# Patient Record
Sex: FEMALE | Race: OTHER | HISPANIC OR LATINO
[De-identification: names, ages, dates, MRNs, and addresses within clinical notes are randomized per-mention and may not be internally consistent; named-entity substitution may affect disease eponyms.]

---

## 2024-02-01 ENCOUNTER — APPOINTMENT (OUTPATIENT)
Dept: ANTEPARTUM | Facility: CLINIC | Age: 39
End: 2024-02-01
Payer: MEDICAID

## 2024-02-01 ENCOUNTER — ASOB RESULT (OUTPATIENT)
Age: 39
End: 2024-02-01

## 2024-02-01 PROCEDURE — 76819 FETAL BIOPHYS PROFIL W/O NST: CPT

## 2024-02-01 PROCEDURE — 76816 OB US FOLLOW-UP PER FETUS: CPT

## 2024-02-29 ENCOUNTER — APPOINTMENT (OUTPATIENT)
Dept: ANTEPARTUM | Facility: CLINIC | Age: 39
End: 2024-02-29
Payer: MEDICAID

## 2024-02-29 ENCOUNTER — ASOB RESULT (OUTPATIENT)
Age: 39
End: 2024-02-29

## 2024-02-29 PROBLEM — Z00.00 ENCOUNTER FOR PREVENTIVE HEALTH EXAMINATION: Status: ACTIVE | Noted: 2024-02-29

## 2024-02-29 PROCEDURE — 76816 OB US FOLLOW-UP PER FETUS: CPT

## 2024-02-29 PROCEDURE — 76819 FETAL BIOPHYS PROFIL W/O NST: CPT

## 2024-03-06 ENCOUNTER — ASOB RESULT (OUTPATIENT)
Age: 39
End: 2024-03-06

## 2024-03-06 ENCOUNTER — APPOINTMENT (OUTPATIENT)
Dept: MATERNAL FETAL MEDICINE | Facility: CLINIC | Age: 39
End: 2024-03-06
Payer: MEDICAID

## 2024-03-06 PROCEDURE — 99202 OFFICE O/P NEW SF 15 MIN: CPT | Mod: TH,95

## 2024-03-28 ENCOUNTER — ASOB RESULT (OUTPATIENT)
Age: 39
End: 2024-03-28

## 2024-03-28 ENCOUNTER — APPOINTMENT (OUTPATIENT)
Dept: ANTEPARTUM | Facility: CLINIC | Age: 39
End: 2024-03-28
Payer: MEDICAID

## 2024-03-28 PROCEDURE — 76819 FETAL BIOPHYS PROFIL W/O NST: CPT

## 2024-03-28 PROCEDURE — 76816 OB US FOLLOW-UP PER FETUS: CPT

## 2024-04-05 ENCOUNTER — OUTPATIENT (OUTPATIENT)
Dept: OUTPATIENT SERVICES | Facility: HOSPITAL | Age: 39
LOS: 1 days | End: 2024-04-05
Payer: COMMERCIAL

## 2024-04-05 DIAGNOSIS — Z01.812 ENCOUNTER FOR PREPROCEDURAL LABORATORY EXAMINATION: ICD-10-CM

## 2024-04-05 LAB
ABO RH CONFIRMATION: SIGNIFICANT CHANGE UP
BASOPHILS # BLD AUTO: 0.03 K/UL — SIGNIFICANT CHANGE UP (ref 0–0.2)
BASOPHILS NFR BLD AUTO: 0.4 % — SIGNIFICANT CHANGE UP (ref 0–2)
BLD GP AB SCN SERPL QL: SIGNIFICANT CHANGE UP
COMMENT - BLOOD BANK: SIGNIFICANT CHANGE UP
EOSINOPHIL # BLD AUTO: 0.08 K/UL — SIGNIFICANT CHANGE UP (ref 0–0.5)
EOSINOPHIL NFR BLD AUTO: 1 % — SIGNIFICANT CHANGE UP (ref 0–6)
HCT VFR BLD CALC: 35.6 % — SIGNIFICANT CHANGE UP (ref 34.5–45)
HGB BLD-MCNC: 12.1 G/DL — SIGNIFICANT CHANGE UP (ref 11.5–15.5)
IMM GRANULOCYTES NFR BLD AUTO: 0.9 % — SIGNIFICANT CHANGE UP (ref 0–0.9)
LYMPHOCYTES # BLD AUTO: 1.21 K/UL — SIGNIFICANT CHANGE UP (ref 1–3.3)
LYMPHOCYTES # BLD AUTO: 15.6 % — SIGNIFICANT CHANGE UP (ref 13–44)
MCHC RBC-ENTMCNC: 30.8 PG — SIGNIFICANT CHANGE UP (ref 27–34)
MCHC RBC-ENTMCNC: 34 GM/DL — SIGNIFICANT CHANGE UP (ref 32–36)
MCV RBC AUTO: 90.6 FL — SIGNIFICANT CHANGE UP (ref 80–100)
MONOCYTES # BLD AUTO: 0.38 K/UL — SIGNIFICANT CHANGE UP (ref 0–0.9)
MONOCYTES NFR BLD AUTO: 4.9 % — SIGNIFICANT CHANGE UP (ref 2–14)
NEUTROPHILS # BLD AUTO: 5.97 K/UL — SIGNIFICANT CHANGE UP (ref 1.8–7.4)
NEUTROPHILS NFR BLD AUTO: 77.2 % — HIGH (ref 43–77)
PLATELET # BLD AUTO: 252 K/UL — SIGNIFICANT CHANGE UP (ref 150–400)
RBC # BLD: 3.93 M/UL — SIGNIFICANT CHANGE UP (ref 3.8–5.2)
RBC # FLD: 14 % — SIGNIFICANT CHANGE UP (ref 10.3–14.5)
WBC # BLD: 7.74 K/UL — SIGNIFICANT CHANGE UP (ref 3.8–10.5)
WBC # FLD AUTO: 7.74 K/UL — SIGNIFICANT CHANGE UP (ref 3.8–10.5)

## 2024-04-05 PROCEDURE — 85025 COMPLETE CBC W/AUTO DIFF WBC: CPT

## 2024-04-05 PROCEDURE — 36415 COLL VENOUS BLD VENIPUNCTURE: CPT

## 2024-04-05 PROCEDURE — 86901 BLOOD TYPING SEROLOGIC RH(D): CPT

## 2024-04-05 PROCEDURE — 86900 BLOOD TYPING SEROLOGIC ABO: CPT

## 2024-04-05 PROCEDURE — 86850 RBC ANTIBODY SCREEN: CPT

## 2024-04-07 ENCOUNTER — TRANSCRIPTION ENCOUNTER (OUTPATIENT)
Age: 39
End: 2024-04-07

## 2024-04-07 RX ORDER — ACETAMINOPHEN 500 MG
975 TABLET ORAL ONCE
Refills: 0 | Status: COMPLETED | OUTPATIENT
Start: 2024-04-08 | End: 2024-04-08

## 2024-04-07 RX ORDER — CEFAZOLIN SODIUM 1 G
2000 VIAL (EA) INJECTION ONCE
Refills: 0 | Status: COMPLETED | OUTPATIENT
Start: 2024-04-08 | End: 2024-04-08

## 2024-04-07 RX ORDER — SODIUM CHLORIDE 9 MG/ML
1000 INJECTION, SOLUTION INTRAVENOUS
Refills: 0 | Status: DISCONTINUED | OUTPATIENT
Start: 2024-04-08 | End: 2024-04-08

## 2024-04-07 RX ORDER — OXYTOCIN 10 UNIT/ML
333.33 VIAL (ML) INJECTION
Qty: 20 | Refills: 0 | Status: DISCONTINUED | OUTPATIENT
Start: 2024-04-08 | End: 2024-04-10

## 2024-04-07 RX ORDER — SCOPALAMINE 1 MG/3D
1 PATCH, EXTENDED RELEASE TRANSDERMAL ONCE
Refills: 0 | Status: COMPLETED | OUTPATIENT
Start: 2024-04-08 | End: 2024-04-08

## 2024-04-07 RX ORDER — CHLORHEXIDINE GLUCONATE 213 G/1000ML
1 SOLUTION TOPICAL DAILY
Refills: 0 | Status: DISCONTINUED | OUTPATIENT
Start: 2024-04-08 | End: 2024-04-08

## 2024-04-07 RX ORDER — FAMOTIDINE 10 MG/ML
20 INJECTION INTRAVENOUS ONCE
Refills: 0 | Status: COMPLETED | OUTPATIENT
Start: 2024-04-08 | End: 2024-04-08

## 2024-04-07 RX ORDER — SODIUM CHLORIDE 9 MG/ML
1000 INJECTION, SOLUTION INTRAVENOUS ONCE
Refills: 0 | Status: COMPLETED | OUTPATIENT
Start: 2024-04-08 | End: 2024-04-08

## 2024-04-07 RX ORDER — CITRIC ACID/SODIUM CITRATE 300-500 MG
30 SOLUTION, ORAL ORAL ONCE
Refills: 0 | Status: COMPLETED | OUTPATIENT
Start: 2024-04-08 | End: 2024-04-08

## 2024-04-08 ENCOUNTER — INPATIENT (INPATIENT)
Facility: HOSPITAL | Age: 39
LOS: 1 days | Discharge: HOME | End: 2024-04-10
Attending: OBSTETRICS & GYNECOLOGY | Admitting: OBSTETRICS & GYNECOLOGY
Payer: COMMERCIAL

## 2024-04-08 ENCOUNTER — TRANSCRIPTION ENCOUNTER (OUTPATIENT)
Age: 39
End: 2024-04-08

## 2024-04-08 VITALS — DIASTOLIC BLOOD PRESSURE: 54 MMHG | HEART RATE: 82 BPM | SYSTOLIC BLOOD PRESSURE: 97 MMHG

## 2024-04-08 LAB
BASOPHILS # BLD AUTO: 0.04 K/UL — SIGNIFICANT CHANGE UP (ref 0–0.2)
BASOPHILS NFR BLD AUTO: 0.5 % — SIGNIFICANT CHANGE UP (ref 0–2)
BLD GP AB SCN SERPL QL: SIGNIFICANT CHANGE UP
EOSINOPHIL # BLD AUTO: 0.03 K/UL — SIGNIFICANT CHANGE UP (ref 0–0.5)
EOSINOPHIL NFR BLD AUTO: 0.3 % — SIGNIFICANT CHANGE UP (ref 0–6)
HCT VFR BLD CALC: 38.6 % — SIGNIFICANT CHANGE UP (ref 34.5–45)
HGB BLD-MCNC: 13 G/DL — SIGNIFICANT CHANGE UP (ref 11.5–15.5)
IMM GRANULOCYTES NFR BLD AUTO: 0.9 % — SIGNIFICANT CHANGE UP (ref 0–0.9)
LYMPHOCYTES # BLD AUTO: 1.48 K/UL — SIGNIFICANT CHANGE UP (ref 1–3.3)
LYMPHOCYTES # BLD AUTO: 16.9 % — SIGNIFICANT CHANGE UP (ref 13–44)
MCHC RBC-ENTMCNC: 30.5 PG — SIGNIFICANT CHANGE UP (ref 27–34)
MCHC RBC-ENTMCNC: 33.7 GM/DL — SIGNIFICANT CHANGE UP (ref 32–36)
MCV RBC AUTO: 90.6 FL — SIGNIFICANT CHANGE UP (ref 80–100)
MONOCYTES # BLD AUTO: 0.45 K/UL — SIGNIFICANT CHANGE UP (ref 0–0.9)
MONOCYTES NFR BLD AUTO: 5.1 % — SIGNIFICANT CHANGE UP (ref 2–14)
NEUTROPHILS # BLD AUTO: 6.68 K/UL — SIGNIFICANT CHANGE UP (ref 1.8–7.4)
NEUTROPHILS NFR BLD AUTO: 76.3 % — SIGNIFICANT CHANGE UP (ref 43–77)
PLATELET # BLD AUTO: 254 K/UL — SIGNIFICANT CHANGE UP (ref 150–400)
RBC # BLD: 4.26 M/UL — SIGNIFICANT CHANGE UP (ref 3.8–5.2)
RBC # FLD: 14.1 % — SIGNIFICANT CHANGE UP (ref 10.3–14.5)
WBC # BLD: 8.76 K/UL — SIGNIFICANT CHANGE UP (ref 3.8–10.5)
WBC # FLD AUTO: 8.76 K/UL — SIGNIFICANT CHANGE UP (ref 3.8–10.5)

## 2024-04-08 DEVICE — SURGICEL SNOW 4 X 4": Type: IMPLANTABLE DEVICE | Status: FUNCTIONAL

## 2024-04-08 RX ORDER — KETOROLAC TROMETHAMINE 30 MG/ML
30 SYRINGE (ML) INJECTION EVERY 6 HOURS
Refills: 0 | Status: DISCONTINUED | OUTPATIENT
Start: 2024-04-08 | End: 2024-04-09

## 2024-04-08 RX ORDER — ENOXAPARIN SODIUM 100 MG/ML
40 INJECTION SUBCUTANEOUS EVERY 24 HOURS
Refills: 0 | Status: DISCONTINUED | OUTPATIENT
Start: 2024-04-08 | End: 2024-04-10

## 2024-04-08 RX ORDER — ACETAMINOPHEN 500 MG
975 TABLET ORAL
Refills: 0 | Status: DISCONTINUED | OUTPATIENT
Start: 2024-04-08 | End: 2024-04-10

## 2024-04-08 RX ORDER — OXYTOCIN 10 UNIT/ML
333.33 VIAL (ML) INJECTION
Qty: 20 | Refills: 0 | Status: DISCONTINUED | OUTPATIENT
Start: 2024-04-08 | End: 2024-04-10

## 2024-04-08 RX ORDER — SIMETHICONE 80 MG/1
80 TABLET, CHEWABLE ORAL EVERY 4 HOURS
Refills: 0 | Status: DISCONTINUED | OUTPATIENT
Start: 2024-04-08 | End: 2024-04-10

## 2024-04-08 RX ORDER — OXYCODONE HYDROCHLORIDE 5 MG/1
5 TABLET ORAL ONCE
Refills: 0 | Status: DISCONTINUED | OUTPATIENT
Start: 2024-04-08 | End: 2024-04-10

## 2024-04-08 RX ORDER — MAGNESIUM HYDROXIDE 400 MG/1
30 TABLET, CHEWABLE ORAL
Refills: 0 | Status: DISCONTINUED | OUTPATIENT
Start: 2024-04-08 | End: 2024-04-10

## 2024-04-08 RX ORDER — IBUPROFEN 200 MG
600 TABLET ORAL EVERY 6 HOURS
Refills: 0 | Status: COMPLETED | OUTPATIENT
Start: 2024-04-08 | End: 2025-03-07

## 2024-04-08 RX ORDER — SODIUM CHLORIDE 9 MG/ML
1000 INJECTION, SOLUTION INTRAVENOUS
Refills: 0 | Status: DISCONTINUED | OUTPATIENT
Start: 2024-04-08 | End: 2024-04-10

## 2024-04-08 RX ORDER — DIPHENHYDRAMINE HCL 50 MG
25 CAPSULE ORAL EVERY 6 HOURS
Refills: 0 | Status: DISCONTINUED | OUTPATIENT
Start: 2024-04-08 | End: 2024-04-10

## 2024-04-08 RX ORDER — TETANUS TOXOID, REDUCED DIPHTHERIA TOXOID AND ACELLULAR PERTUSSIS VACCINE, ADSORBED 5; 2.5; 8; 8; 2.5 [IU]/.5ML; [IU]/.5ML; UG/.5ML; UG/.5ML; UG/.5ML
0.5 SUSPENSION INTRAMUSCULAR ONCE
Refills: 0 | Status: DISCONTINUED | OUTPATIENT
Start: 2024-04-08 | End: 2024-04-10

## 2024-04-08 RX ORDER — OXYCODONE HYDROCHLORIDE 5 MG/1
5 TABLET ORAL
Refills: 0 | Status: DISCONTINUED | OUTPATIENT
Start: 2024-04-08 | End: 2024-04-10

## 2024-04-08 RX ORDER — LANOLIN
1 OINTMENT (GRAM) TOPICAL EVERY 6 HOURS
Refills: 0 | Status: DISCONTINUED | OUTPATIENT
Start: 2024-04-08 | End: 2024-04-10

## 2024-04-08 RX ADMIN — SCOPALAMINE 1 PATCH: 1 PATCH, EXTENDED RELEASE TRANSDERMAL at 15:34

## 2024-04-08 RX ADMIN — FAMOTIDINE 20 MILLIGRAM(S): 10 INJECTION INTRAVENOUS at 15:34

## 2024-04-08 RX ADMIN — SODIUM CHLORIDE 2000 MILLILITER(S): 9 INJECTION, SOLUTION INTRAVENOUS at 14:44

## 2024-04-08 RX ADMIN — Medication 30 MILLIGRAM(S): at 17:00

## 2024-04-08 RX ADMIN — SODIUM CHLORIDE 125 MILLILITER(S): 9 INJECTION, SOLUTION INTRAVENOUS at 15:40

## 2024-04-08 RX ADMIN — Medication 1000 MILLIUNIT(S)/MIN: at 20:27

## 2024-04-08 RX ADMIN — Medication 30 MILLILITER(S): at 15:34

## 2024-04-08 RX ADMIN — Medication 1000 MILLIUNIT(S)/MIN: at 17:34

## 2024-04-08 RX ADMIN — Medication 0.2 MILLIGRAM(S): at 17:44

## 2024-04-08 RX ADMIN — Medication 2000 MILLIGRAM(S): at 16:54

## 2024-04-08 RX ADMIN — Medication 975 MILLIGRAM(S): at 15:34

## 2024-04-08 RX ADMIN — Medication 975 MILLIGRAM(S): at 19:15

## 2024-04-08 NOTE — OB RN DELIVERY SUMMARY - NSSELHIDDEN_OBGYN_ALL_OB_FT
[NS_DeliveryAttending1_OBGYN_ALL_OB_FT:[id number]] [NS_DeliveryAttending1_OBGYN_ALL_OB_FT:[id number]=],[NS_DeliveryAssist1_OBGYN_ALL_OB_FT:[id number]=],[NS_DeliveryRN_OBGYN_ALL_OB_FT:[id number]=]

## 2024-04-08 NOTE — OB PROVIDER DELIVERY SUMMARY - NSPROVIDERDELIVERYNOTE_OBGYN_ALL_OB_FT
Brief  Delivery Summary    Procedure: rLTCS for IUP @ 39wks GA, prior CSx1  Findings: Viable male infant, apgars 9/9, weight 3770g,  cephalic presentation. Grossly normal uterus, fallopian tubes and ovaries.   Single layer uterine closure  SubQ skin closure  EBL: 1303cc  UOP: 200cc  Fluids in OR: 1500cc  Complications: stable PPH 2/2 uterine atony received IM methergine x1 with good response in tone Brief  Delivery Summary    Procedure: rLTCS for IUP @ 39wks GA, prior CSx1  Findings: Viable male infant, apgars 9/9, weight 3770g,  cephalic presentation. Grossly normal uterus, fallopian tubes and ovaries.   Single layer uterine closure with multiples figure of eight. SNOW placed over closed hysterotomy  SubQ skin closure  EBL: 1303cc  UOP: 200cc  Fluids in OR: 1500cc  Complications: stable PPH 2/2 uterine atony received IM methergine x1 with good response in tone    agree with above  ppalos Brief  Delivery Summary    Procedure: rLTCS for IUP @ 39wks GA, prior CSx1  Findings: Viable male infant, apgars 9/9, weight 3770g,  cephalic presentation. Grossly normal uterus, fallopian tubes and ovaries.   Single layer uterine closure with multiples figure of eight. SNOW placed over closed hysterotomy  SubQ skin closure  EBL: 1303cc  UOP: 200cc  Fluids in OR: 1500cc  Complications: surgical sno placed over hysterotomy site. stable PPH 2/2 uterine atony received IM methergine x1 with good response in tone.     Dictation # [id number]    agree with above  ppalos

## 2024-04-08 NOTE — OB RN INTRAOPERATIVE NOTE - NSSELHIDDEN_OBGYN_ALL_OB_FT
[NS_DeliveryAttending1_OBGYN_ALL_OB_FT:[id number]=],[NS_DeliveryAssist1_OBGYN_ALL_OB_FT:[id number]=],[NS_DeliveryRN_OBGYN_ALL_OB_FT:[id number]=]

## 2024-04-08 NOTE — OB PROVIDER H&P - ATTENDING COMMENTS
@ 39wks here for scheduled repeat  section. GBSneg. Maternal/fetal status reassuring    -admit  -routine  section orders  -NPO  -anesthesia consult  -proceed with  section  -risks and benefits discussed. Consents explained and signed. ALl questions and concerns answered.  ppalos

## 2024-04-08 NOTE — DISCHARGE NOTE OB - CARE PROVIDER_API CALL
Lea Batista  Obstetrics and Gynecology  [facility] [place] [address]  Phone: [phone]  Fax: [phone]  Established Patient  Follow Up Time: 2 weeks

## 2024-04-08 NOTE — OB PROVIDER H&P - NSCORESITESY/N_GEN_A_CORE_RD
Denies known Latex allergy or symptoms of Latex sensitivity.    Evelia Alexander is a 54 year old female presenting with chest pain since last night with mid sternum without cough, some relief when pushing on mid sternum.    Patient denies fever.  Patient denies any other concerns or symptoms.       No

## 2024-04-08 NOTE — DISCHARGE NOTE OB - NS MD DC FALL RISK RISK
For information on Fall & Injury Prevention, visit: [email or web address] OR  [email or web address] OR  https://www.cdc.gov/steadi/patient.html

## 2024-04-08 NOTE — OB PROVIDER H&P - ASSESSMENT
A/P: 38y  @ 39wks GA admitted to L&D for rCS for prior CSx1  -Admit to L&D  -Consent  -Admission labs  -NPO  -IV fluids  -Fetus: Cat I tracing. Continuous toco and fetal monitoring.   -GBS: Negative, no GBS ppx required   -DVT ppx: Ambulate and SCD's while in bed ]  -Ancef 2g for ppx    Discussed with Dr. Batista

## 2024-04-08 NOTE — OB RN DELIVERY SUMMARY - NS_SEPSISRSKCALC_OBGYN_ALL_OB_FT
No temperature has been documented for this patient in CPN or on the OB Flowsheet. Ensure the highest temperature during labor was documented on the OB Flowsheet.  No gestational age at birth has been documented. Ensure delivery date/time has been entered above.  Rupture of membranes must be entered above.   EOS calculated successfully. EOS Risk Factor: 0.5/1000 live births ([facility] national incidence); GA=39w;Temp=98.6; ROM=0.033; GBS='Negative'; Antibiotics='No antibiotics or any antibiotics < 2 hrs prior to birth'

## 2024-04-08 NOTE — OB PROVIDER DELIVERY SUMMARY - NSSELHIDDEN_OBGYN_ALL_OB_FT
[NS_DeliveryAttending1_OBGYN_ALL_OB_FT:[id number]=],[NS_DeliveryAssist1_OBGYN_ALL_OB_FT:[id number]=],[NS_DeliveryRN_OBGYN_ALL_OB_FT:[id number]=]

## 2024-04-08 NOTE — DISCHARGE NOTE OB - PATIENT PORTAL LINK FT
You can access the FollowMyHealth Patient Portal offered by [facility] by registering at the following website: [email or web address]. By joining [employer]’s FollowMyHealth portal, you will also be able to view your health information using other applications (apps) compatible with our system.

## 2024-04-08 NOTE — DISCHARGE NOTE OB - CARE PLAN
Principal Discharge DX:	 delivery delivered  Assessment and plan of treatment:	Assessment and Plan of Treatment: Please call your provider to schedule postoperative wound check visit in 1-2 weeks. Take medications as directed, regular diet, activity as tolerated. Exclusive breast feeding for the first 6 months is recommended. Nothing per vagina for 6 weeks (incl. sex, douching, etc). If you have additional concerns, please inform your provider.  Secondary Diagnosis:	Anemia due to acute blood loss  Assessment and plan of treatment:	Patient with mild anemia secondary to acute blood loss with delivery, stable. Patient should follow with OB  at regular postpartum check, and to call doctor sooner if develop symptoms of anemia such as shortness of breath, lightheadedness, or fainting. If medication such as ferrous sulfate is prescribed, take as directed.   1

## 2024-04-08 NOTE — OB PROVIDER H&P - NSHOSPITALIZATION_OBGYN_ALL_OB
Patient called to say that she started her new job, so her new Insurance that will start July 1st will now be in-network for Dr. Paniagua's visits.  She is ready to make appt for her return visit in July.  After chart review, advised her I would place orders for 6mwt, Echo, RHC, labs & follow up with Dr. Paniagua.  She should hear from Patricia to schedule.      I asked that as soon as she has her new insurance card, she update her information in our system, as well as send us a picture attached to [employer] so Janey can submit new PA.  Patient verbalized understanding, agreed with plan and denied any further questions. Yoly Villegas RN on 6/21/2023 at 10:55 AM    Placed all orders & sent msg to Patricia. Yoly Villegas RN on 6/21/2023 at 11:03 AM    
No

## 2024-04-08 NOTE — DISCHARGE NOTE OB - PLAN OF CARE
Patient with mild anemia secondary to acute blood loss with delivery, stable. Patient should follow with OB  at regular postpartum check, and to call doctor sooner if develop symptoms of anemia such as shortness of breath, lightheadedness, or fainting. If medication such as ferrous sulfate is prescribed, take as directed. Assessment and Plan of Treatment: Please call your provider to schedule postoperative wound check visit in 1-2 weeks. Take medications as directed, regular diet, activity as tolerated. Exclusive breast feeding for the first 6 months is recommended. Nothing per vagina for 6 weeks (incl. sex, douching, etc). If you have additional concerns, please inform your provider.

## 2024-04-08 NOTE — OB PROVIDER H&P - NSHPPHYSICALEXAM_GEN_ALL_CORE
T(C): --  HR: --  BP: --  RR: --  SpO2: --  Gen: NAD, well-appearing   Abd: Soft, gravid  Ext: non-tender, non-edematous  SVE:    Bedside sono:  FHT:  [place]: Vital Signs Last 24 Hrs  T(C): 36.7 (08 Apr 2024 13:47), Max: 36.7 (08 Apr 2024 13:47)  T(F): 98.1 (08 Apr 2024 13:47), Max: 98.1 (08 Apr 2024 13:47)  HR: 82 (08 Apr 2024 13:47) (82 - 82)  BP: 97/54 (08 Apr 2024 13:47) (97/54 - 97/54)  BP(mean): --  RR: 18 (08 Apr 2024 13:47) (18 - 18)  SpO2: --      Gen: NAD, well-appearing   Abd: Soft, gravid  Ext: non-tender, non-edematous     Bedside sono:  FHT: baseline 130, moderate variability, + accels, - decels  [place]: irregular contractions

## 2024-04-08 NOTE — OB PROVIDER H&P - NSLOWPPHRISK_OBGYN_A_OB
[place] Pregnancy/Less than or equal to 4 previous vaginal births/No known bleeding disorder/No history of postpartum hemorrhage/No other PPH risks indicated

## 2024-04-08 NOTE — DISCHARGE NOTE OB - MEDICATION SUMMARY - MEDICATIONS TO TAKE
I will START or STAY ON the medications listed below when I get home from the hospital:    ibuprofen 600 mg oral tablet  -- 1 tab(s) by mouth every 6 hours  -- Indication: For as needed for pain    acetaminophen 325 mg oral tablet  -- 3 tab(s) by mouth every 6 hours  -- Indication: For as needed for pain

## 2024-04-08 NOTE — OB PROVIDER H&P - HISTORY OF PRESENT ILLNESS
38 year old  at 39wk by LMP who presents to L&D for rCS for prior CS x1. Pt feeling well today, denies ctx, vb, lof. Endorses positive fetal movements,      FLORA: 4/15/24   LMP: 7/10/23      Pregnancy course:   Prior CSx1   AMA      Obhx:     PCS 3/22/10 40wk in [facility], due to narrow pelvis   Gynhx: denies fibroids, cysts, STIs, abnl paps   Pmhx: denies   Pshx: CSx1, adolfo   Meds: PNV   Allergies: NKDA   Social Hx: denies tobacco, recreational drug, alcohol use during pregnancy. Feels safe at home      Ultrasound:  vtx, anterior  EFW: 3419g 78%ile (3/28)

## 2024-04-09 LAB
BASOPHILS # BLD AUTO: 0.03 K/UL — SIGNIFICANT CHANGE UP (ref 0–0.2)
BASOPHILS NFR BLD AUTO: 0.3 % — SIGNIFICANT CHANGE UP (ref 0–2)
EOSINOPHIL # BLD AUTO: 0.02 K/UL — SIGNIFICANT CHANGE UP (ref 0–0.5)
EOSINOPHIL NFR BLD AUTO: 0.2 % — SIGNIFICANT CHANGE UP (ref 0–6)
HCT VFR BLD CALC: 25.8 % — LOW (ref 34.5–45)
HCT VFR BLD CALC: 28.9 % — LOW (ref 34.5–45)
HCT VFR BLD CALC: 29.9 % — LOW (ref 34.5–45)
HGB BLD-MCNC: 10.2 G/DL — LOW (ref 11.5–15.5)
HGB BLD-MCNC: 8.8 G/DL — LOW (ref 11.5–15.5)
HGB BLD-MCNC: 9.7 G/DL — LOW (ref 11.5–15.5)
IMM GRANULOCYTES NFR BLD AUTO: 0.6 % — SIGNIFICANT CHANGE UP (ref 0–0.9)
LYMPHOCYTES # BLD AUTO: 1.26 K/UL — SIGNIFICANT CHANGE UP (ref 1–3.3)
LYMPHOCYTES # BLD AUTO: 12.4 % — LOW (ref 13–44)
MCHC RBC-ENTMCNC: 30.3 PG — SIGNIFICANT CHANGE UP (ref 27–34)
MCHC RBC-ENTMCNC: 30.8 PG — SIGNIFICANT CHANGE UP (ref 27–34)
MCHC RBC-ENTMCNC: 31.2 PG — SIGNIFICANT CHANGE UP (ref 27–34)
MCHC RBC-ENTMCNC: 33.6 GM/DL — SIGNIFICANT CHANGE UP (ref 32–36)
MCHC RBC-ENTMCNC: 34.1 GM/DL — SIGNIFICANT CHANGE UP (ref 32–36)
MCHC RBC-ENTMCNC: 34.1 GM/DL — SIGNIFICANT CHANGE UP (ref 32–36)
MCV RBC AUTO: 90.3 FL — SIGNIFICANT CHANGE UP (ref 80–100)
MCV RBC AUTO: 90.3 FL — SIGNIFICANT CHANGE UP (ref 80–100)
MCV RBC AUTO: 91.5 FL — SIGNIFICANT CHANGE UP (ref 80–100)
MEV IGG SER-ACNC: 26.5 AU/ML — SIGNIFICANT CHANGE UP
MEV IGG+IGM SER-IMP: POSITIVE — SIGNIFICANT CHANGE UP
MONOCYTES # BLD AUTO: 0.69 K/UL — SIGNIFICANT CHANGE UP (ref 0–0.9)
MONOCYTES NFR BLD AUTO: 6.8 % — SIGNIFICANT CHANGE UP (ref 2–14)
NEUTROPHILS # BLD AUTO: 8.12 K/UL — HIGH (ref 1.8–7.4)
NEUTROPHILS NFR BLD AUTO: 79.7 % — HIGH (ref 43–77)
PLATELET # BLD AUTO: 196 K/UL — SIGNIFICANT CHANGE UP (ref 150–400)
PLATELET # BLD AUTO: 211 K/UL — SIGNIFICANT CHANGE UP (ref 150–400)
PLATELET # BLD AUTO: 216 K/UL — SIGNIFICANT CHANGE UP (ref 150–400)
RBC # BLD: 2.82 M/UL — LOW (ref 3.8–5.2)
RBC # BLD: 3.2 M/UL — LOW (ref 3.8–5.2)
RBC # BLD: 3.31 M/UL — LOW (ref 3.8–5.2)
RBC # FLD: 13.6 % — SIGNIFICANT CHANGE UP (ref 10.3–14.5)
RBC # FLD: 13.7 % — SIGNIFICANT CHANGE UP (ref 10.3–14.5)
RBC # FLD: 13.9 % — SIGNIFICANT CHANGE UP (ref 10.3–14.5)
RUBV IGG SER-ACNC: 3 INDEX — SIGNIFICANT CHANGE UP
RUBV IGG SER-IMP: POSITIVE — SIGNIFICANT CHANGE UP
T PALLIDUM AB TITR SER: NEGATIVE — SIGNIFICANT CHANGE UP
WBC # BLD: 10.18 K/UL — SIGNIFICANT CHANGE UP (ref 3.8–10.5)
WBC # BLD: 12.18 K/UL — HIGH (ref 3.8–10.5)
WBC # BLD: 7.37 K/UL — SIGNIFICANT CHANGE UP (ref 3.8–10.5)
WBC # FLD AUTO: 10.18 K/UL — SIGNIFICANT CHANGE UP (ref 3.8–10.5)
WBC # FLD AUTO: 12.18 K/UL — HIGH (ref 3.8–10.5)
WBC # FLD AUTO: 7.37 K/UL — SIGNIFICANT CHANGE UP (ref 3.8–10.5)

## 2024-04-09 RX ORDER — ACETAMINOPHEN 500 MG
3 TABLET ORAL
Qty: 60 | Refills: 0
Start: 2024-04-09 | End: 2024-04-13

## 2024-04-09 RX ORDER — IBUPROFEN 200 MG
600 TABLET ORAL EVERY 6 HOURS
Refills: 0 | Status: DISCONTINUED | OUTPATIENT
Start: 2024-04-09 | End: 2024-04-10

## 2024-04-09 RX ORDER — SODIUM CHLORIDE 9 MG/ML
500 INJECTION, SOLUTION INTRAVENOUS ONCE
Refills: 0 | Status: DISCONTINUED | OUTPATIENT
Start: 2024-04-09 | End: 2024-04-10

## 2024-04-09 RX ORDER — SODIUM CHLORIDE 9 MG/ML
1000 INJECTION, SOLUTION INTRAVENOUS ONCE
Refills: 0 | Status: COMPLETED | OUTPATIENT
Start: 2024-04-09 | End: 2024-04-09

## 2024-04-09 RX ORDER — IBUPROFEN 200 MG
1 TABLET ORAL
Qty: 20 | Refills: 0
Start: 2024-04-09 | End: 2024-04-13

## 2024-04-09 RX ADMIN — Medication 975 MILLIGRAM(S): at 02:11

## 2024-04-09 RX ADMIN — Medication 30 MILLIGRAM(S): at 00:27

## 2024-04-09 RX ADMIN — ENOXAPARIN SODIUM 40 MILLIGRAM(S): 100 INJECTION SUBCUTANEOUS at 05:34

## 2024-04-09 RX ADMIN — Medication 600 MILLIGRAM(S): at 23:41

## 2024-04-09 RX ADMIN — Medication 600 MILLIGRAM(S): at 18:14

## 2024-04-09 RX ADMIN — Medication 975 MILLIGRAM(S): at 08:34

## 2024-04-09 RX ADMIN — Medication 30 MILLIGRAM(S): at 12:01

## 2024-04-09 RX ADMIN — Medication 30 MILLIGRAM(S): at 05:33

## 2024-04-09 RX ADMIN — Medication 975 MILLIGRAM(S): at 15:58

## 2024-04-09 RX ADMIN — SODIUM CHLORIDE 1000 MILLILITER(S): 9 INJECTION, SOLUTION INTRAVENOUS at 01:00

## 2024-04-09 RX ADMIN — Medication 975 MILLIGRAM(S): at 21:06

## 2024-04-09 NOTE — CHART NOTE - NSCHARTNOTEFT_GEN_A_CORE
Notified by nursing of low BP.    Patient seen at bedside.   States she feels lightheaded when she sits up. Presently denies dizziness, CP, SOB, N/V.    Vital Signs Last 24 Hrs  T(C): 36.8 (08 Apr 2024 23:59), Max: 37.1 (08 Apr 2024 20:30)  T(F): 98.2 (08 Apr 2024 23:59), Max: 98.8 (08 Apr 2024 20:30)  HR: 73 (08 Apr 2024 23:59) (66 - 89)  BP: 84/51 (08 Apr 2024 23:59) (79/56 - 104/65)  BP(mean): --  RR: 18 (08 Apr 2024 23:59) (14 - 18)  SpO2: 98% (08 Apr 2024 23:59) (98% - 100%)    Parameters below as of 08 Apr 2024 21:59  Patient On (Oxygen Delivery Method): room air    PE:  Gen: NAD  Abd: soft, appropriately tender; uterus firm  Pelvic: minimal lochia   Ext: Nontender BL LE     A/P:  - stat CBC  - LR bolus   - continue to follow    D/w Dr. Chan

## 2024-04-10 VITALS
SYSTOLIC BLOOD PRESSURE: 90 MMHG | TEMPERATURE: 98 F | RESPIRATION RATE: 16 BRPM | OXYGEN SATURATION: 96 % | HEART RATE: 82 BPM | DIASTOLIC BLOOD PRESSURE: 45 MMHG

## 2024-04-10 PROCEDURE — 86762 RUBELLA ANTIBODY: CPT

## 2024-04-10 PROCEDURE — 86900 BLOOD TYPING SEROLOGIC ABO: CPT

## 2024-04-10 PROCEDURE — 36415 COLL VENOUS BLD VENIPUNCTURE: CPT

## 2024-04-10 PROCEDURE — 85027 COMPLETE CBC AUTOMATED: CPT

## 2024-04-10 PROCEDURE — 86901 BLOOD TYPING SEROLOGIC RH(D): CPT

## 2024-04-10 PROCEDURE — 86780 TREPONEMA PALLIDUM: CPT

## 2024-04-10 PROCEDURE — T1013: CPT

## 2024-04-10 PROCEDURE — C1889: CPT

## 2024-04-10 PROCEDURE — 59025 FETAL NON-STRESS TEST: CPT

## 2024-04-10 PROCEDURE — 85025 COMPLETE CBC W/AUTO DIFF WBC: CPT

## 2024-04-10 PROCEDURE — 59050 FETAL MONITOR W/REPORT: CPT

## 2024-04-10 PROCEDURE — 86765 RUBEOLA ANTIBODY: CPT

## 2024-04-10 PROCEDURE — 86850 RBC ANTIBODY SCREEN: CPT

## 2024-04-10 RX ADMIN — Medication 600 MILLIGRAM(S): at 13:40

## 2024-04-10 RX ADMIN — Medication 975 MILLIGRAM(S): at 09:30

## 2024-04-10 RX ADMIN — MAGNESIUM HYDROXIDE 30 MILLILITER(S): 400 TABLET, CHEWABLE ORAL at 09:41

## 2024-04-10 RX ADMIN — ENOXAPARIN SODIUM 40 MILLIGRAM(S): 100 INJECTION SUBCUTANEOUS at 05:59

## 2024-04-10 RX ADMIN — Medication 600 MILLIGRAM(S): at 05:55

## 2024-04-10 NOTE — PROGRESS NOTE ADULT - ASSESSMENT
A/P:   38y  now POD#1 s/p repeat  section at 39 weeks gestation, c;/b PPH s/p methergine x1.   -Vital Signs Stable  -Voiding, tolerating PO, bowel function nml   -Heme: Hgb 10.2 > 9.7, no anemia sx  -Advance care as tolerated   -Continue routine postpartum/postoperative care and education  -Healthy male infant, declines circumcision.    -Dispo: Continue inpatient management  
A/P:   38y  now POD#1 s/p repeat  section at 39 weeks gestation, c;/b PPH s/p methergine x1.   -Vital Signs Stable  -Voiding, tolerating PO, bowel function nml   -Heme: Hgb 10.2 > 9.7 > 8.8, asymptomatic  -Advance care as tolerated   -Continue routine postpartum/postoperative care and education  -Healthy male infant, declines circumcision.    -Dispo: continue inpatient management

## 2024-04-10 NOTE — PROGRESS NOTE ADULT - SUBJECTIVE AND OBJECTIVE BOX
38y Female s/p c section under spinal anesthesia with duramorph for post op analgesia on 04/08/2024    Vital Signs     T(C): 36.3 (09 Apr 2024 08:41), Max: 37.1 (08 Apr 2024 20:30)  T(F): 97.4 (09 Apr 2024 08:41), Max: 98.8 (08 Apr 2024 20:30)  HR: 82 (09 Apr 2024 13:42) (66 - 89)  BP: 80/51 (09 Apr 2024 13:42) (76/45 - 104/65)  BP(mean): --  RR: 16 (09 Apr 2024 13:42) (14 - 18)  SpO2: 100% (09 Apr 2024 13:42) (98% - 100%)    Parameters below as of 08 Apr 2024 21:59    Patient On (Oxygen Delivery Method): room air            Patient's overall anesthesia satisfaction: Positive    Patients pain is well controlled     No pruritis at this time    Patient seen and doing well     No headache      No residual numbness or weakness, sensory and motor function intact    No anesthetic complications or complaints noted or reported          .            
ANGIE PAULINO is a 38y  now POD#1 s/p repeat  section at 39 weeks gestation, c;/b PPH s/p methergine x1.     S:    The patient has no complaints.   Pain is controlled with current treatment regimen.   She is ambulating without difficulty and tolerating PO.   + flatus/-BM/+ voiding   She endorses appropriate lochia  Patient denies lightheadedness, dizziness, palpitations, shortness of breath and chest pain.     O:    T(C): 37 (24 @ 05:01), Max: 37.1 (24 @ 20:30)  HR: 69 (24 @ 05:01) (66 - 89)  BP: 89/54 (24 @ 05:01) (79/56 - 104/65)  RR: 18 (24 @ 05:01) (14 - 18)  SpO2: 100% (24 @ 05:01) (98% - 100%)  Wt(kg): --    Gen: NAD, AOx3  Pulm: Breathing comfortably on RA  Abdomen:  Soft, non-tender, non-distended  Uterus:  Fundus firm below umbilicus  Incision:  Clean/dry/intact with steri-strips in place  VE:  +Lochia  Ext:  Non-tender, non-edematous     LABS                        9.7    10.18 )-----------( 211      ( 2024 05:02 )             28.9           
ANGIE PAULINO is a 38y  now POD#2 s/p repeat  section at 39 weeks gestation, c;/b PPH s/p methergine x1.     S:    The patient has no complaints.   Pain is controlled with current treatment regimen.   She is ambulating without difficulty and tolerating PO.   + flatus although minimal /-BM/+ voiding   She endorses appropriate lochia, which is decreasing  Patient denies lightheadedness, dizziness, palpitations, shortness of breath and chest pain.     O:    Vital Signs Last 24 Hrs  T(C): 36.7 (10 Apr 2024 05:01), Max: 36.8 (2024 15:30)  T(F): 98 (10 Apr 2024 05:), Max: 98.2 (2024 15:30)  HR: 82 (10 Apr 2024 05:) (69 - 88)  BP: 90/45 (10 Apr 2024 05:) (76/45 - 95/56)  BP(mean): --  RR: 16 (10 Apr 2024 05:01) (16 - 18)  SpO2: 96% (10 Apr 2024 05:01) (96% - 100%)    Parameters below as of 10 Apr 2024 05:01  Patient On (Oxygen Delivery Method): room air        Gen: NAD, AOx3  Pulm: Breathing comfortably on RA  Abdomen:  Soft, non-tender, non-distended  Uterus:  Fundus firm below umbilicus  Incision:  Clean/dry/intact with steri-strips in place  VE:  +Lochia  Ext:  Non-tender, non-edematous     LABS                                     8.8    7.37  )-----------( 196      ( 2024 12:54 )             25.8           
Subjective:  The patient feels well.  She is tolerating regular diet.  She denies nausea and vomiting.  Her pain is controlled.  She reports normal postpartum bleeding.      Physical exam:    Vital Signs Last 24 Hrs  T(C): 37 (09 Apr 2024 05:01), Max: 37.1 (08 Apr 2024 20:30)  T(F): 98.6 (09 Apr 2024 05:01), Max: 98.8 (08 Apr 2024 20:30)  HR: 69 (09 Apr 2024 05:01) (66 - 89)  BP: 89/54 (09 Apr 2024 05:01) (79/56 - 104/65)  BP(mean): --  RR: 18 (09 Apr 2024 05:01) (14 - 18)  SpO2: 100% (09 Apr 2024 05:01) (98% - 100%)    Parameters below as of 08 Apr 2024 21:59  Patient On (Oxygen Delivery Method): room air        Gen: NAD  Breast: Soft, nontender, not engorged.  Abdomen: Soft, nontender, no distension , firm uterine fundus at umbilicus.  Incision: Clean, dry, and intact with steri strips  Pelvic: Normal lochia noted  Ext: No calf tenderness    SCD's on bilateral lower extremities    LABS:                        9.7    10.18 )-----------( 211      ( 09 Apr 2024 05:02 )             28.9     Antibody Screen: NEG (04-08 @ 15:43)      POD # 1  Doing well.  Routine PO care.        .        
Subjective:  The patient feels well.  She is tolerating regular diet.  She denies nausea and vomiting.  Her pain is controlled.  She reports normal postpartum bleeding.      Physical exam:    Vital Signs Last 24 Hrs  T(C): 37 (09 Apr 2024 05:01), Max: 37.1 (08 Apr 2024 20:30)  T(F): 98.6 (09 Apr 2024 05:01), Max: 98.8 (08 Apr 2024 20:30)  HR: 69 (09 Apr 2024 05:01) (66 - 89)  BP: 89/54 (09 Apr 2024 05:01) (79/56 - 104/65)  BP(mean): --  RR: 18 (09 Apr 2024 05:01) (14 - 18)  SpO2: 100% (09 Apr 2024 05:01) (98% - 100%)    Parameters below as of 08 Apr 2024 21:59  Patient On (Oxygen Delivery Method): room air        Gen: NAD  Breast: Soft, nontender, not engorged.  Abdomen: Soft, nontender, no distension , firm uterine fundus at umbilicus.  Incision: Clean, dry, and intact with steri strips  Pelvic: Normal lochia noted  Ext: No calf tenderness    SCD's on bilateral lower extremities    LABS:                        9.7    10.18 )-----------( 211      ( 09 Apr 2024 05:02 )             28.9     Antibody Screen: NEG (04-08 @ 15:43)      POD # 1  Doing well.  Routine PO care.        .

## 2024-04-10 NOTE — PROGRESS NOTE ADULT - ATTENDING COMMENTS
post  day # 2  hx of PPH  no complaints  exam wnl  labs reviewed    cleared for dc  discussed contraception, vaccination, breastfeeding  follow up for post partum visit

## 2024-04-17 ENCOUNTER — EMERGENCY (EMERGENCY)
Facility: HOSPITAL | Age: 39
LOS: 1 days | Discharge: HOME | End: 2024-04-17
Attending: STUDENT IN AN ORGANIZED HEALTH CARE EDUCATION/TRAINING PROGRAM
Payer: COMMERCIAL

## 2024-04-17 VITALS
OXYGEN SATURATION: 100 % | RESPIRATION RATE: 17 BRPM | DIASTOLIC BLOOD PRESSURE: 68 MMHG | SYSTOLIC BLOOD PRESSURE: 101 MMHG | HEART RATE: 74 BPM | HEIGHT: 60 IN | TEMPERATURE: 98 F

## 2024-04-17 PROCEDURE — 99285 EMERGENCY DEPT VISIT HI MDM: CPT | Mod: 25

## 2024-04-17 RX ORDER — KETOROLAC TROMETHAMINE 30 MG/ML
15 SYRINGE (ML) INJECTION ONCE
Refills: 0 | Status: DISCONTINUED | OUTPATIENT
Start: 2024-04-17 | End: 2024-04-17

## 2024-04-17 RX ADMIN — Medication 15 MILLIGRAM(S): at 23:46

## 2024-04-17 NOTE — ED PROVIDER NOTE - OBJECTIVE STATEMENT
38 year old female POD#9 from repeat  by Dr. Batista, presenting today for yellow discharge from her surgical site with some redness and pain. Also notes b/l lower leg swelling, L>R. Denies fevers, chills, chest pain, shortness of breath, palpitations. Has otherwise been recovering well at home.

## 2024-04-17 NOTE — ED PROVIDER NOTE - PHYSICAL EXAMINATION
Gen: well appearing, no acute distress  Head: normocephalic, atraumatic  EENT: EOMI, moist mucous membranes  Lung: no increased work of breathing, clear to auscultation bilaterally, no wheezing, rales, rhonchi, speaking in full sentences  CV: regular rate, regular rhythm  Abd: soft, non-tender, non-distended. (+) surgical site with overlying steri strips without obvious discharge or redness, is (+)minimally tender to palpation.   MSK: (+) LLE>RLE nonpitting edema, no visible deformities, full range of motion in all 4 extremities  Neuro: Awake, alert, no focal neurologic deficits  Skin: No obvious rash, no jaundice  Psych: normal affect, normal speech

## 2024-04-17 NOTE — ED PROVIDER NOTE - CLINICAL SUMMARY MEDICAL DECISION MAKING FREE TEXT BOX
38 year old female POD#9 from repeat  presenting with concern for discharge to surgical site and leg swelling. 38 year old female POD#9 from repeat  presenting with concern for discharge/pain to surgical site and leg swelling. US negative for DVT. Labs reviewed, no actionable findings. ob/gyn consulted, they evaluated patient and sent Rx Augmentin. Patient to f/u with clinic, has appointment for 24. Patient stable and appropriate for dc home.

## 2024-04-17 NOTE — ED PROVIDER NOTE - NSFOLLOWUPINSTRUCTIONS_ED_ALL_ED_FT
- Ch ultrasonido no demostró ningún coágulo en latesha piernas.  - Kristen un seguimiento con la clínica de obstetricia y ginecología según lo programado.  - Puede silva Tylenol extra shaheed, 2 tabletas cada 6 horas o ibuprofeno 600 mg (3 tabletas) cada 6 horas, según sea necesario para los karen y molestias.    --------------------------------------------    - Your ultrasound did not demonstrate any clots in your legs  - Please follow up with the OB/GYN clinic as scheduled.   - You may take Tylenol extra strength 2 tablets every 6 hours or Ibuprofen 600mg (3 tablets) every 6 hours as needed for aches, pains.

## 2024-04-17 NOTE — ED PROVIDER NOTE - PATIENT PORTAL LINK FT
You can access the FollowMyHealth Patient Portal offered by [facility] by registering at the following website: [email or web address]. By joining [employer]’s FollowMyHealth portal, you will also be able to view your health information using other applications (apps) compatible with our system.

## 2024-04-17 NOTE — ED PROVIDER NOTE - ATTENDING CONTRIBUTION TO CARE
38y F s/p recent   by Dr. Batista; presents for abdominal pain and leg swelling. Reports noticing some yellow discharge from surgical incision site earlier today. Also endorsing b/l leg swelling. No fever, vomiting, diarrhea, urinary complaints, chest pain, SOB. Says she is still having some vaginal spotting. On exam, pt in no distress. Abdominal surgical incision site appears clean/dry/intact with steri strips in place, no active discharge noted, no erythema, no underlying abdominal tenderness. +B/l pitting edema of lower extremities, no overlying erythema, no palpable cord. Will check labs, DVT study, consult OBGYN.

## 2024-04-17 NOTE — ED ADULT TRIAGE NOTE - CHIEF COMPLAINT QUOTE
pt had a  on  & reports one of the stitches looked red & had yellow discharge coming out. pt also reporting pain at site

## 2024-04-18 LAB
ALBUMIN SERPL ELPH-MCNC: 3.5 G/DL — SIGNIFICANT CHANGE UP (ref 3.3–5.2)
ALP SERPL-CCNC: 138 U/L — HIGH (ref 40–120)
ALT FLD-CCNC: 55 U/L — HIGH
ANION GAP SERPL CALC-SCNC: 11 MMOL/L — SIGNIFICANT CHANGE UP (ref 5–17)
APTT BLD: 30.2 SEC — SIGNIFICANT CHANGE UP (ref 24.5–35.6)
AST SERPL-CCNC: 40 U/L — HIGH
BASOPHILS # BLD AUTO: 0.05 K/UL — SIGNIFICANT CHANGE UP (ref 0–0.2)
BASOPHILS NFR BLD AUTO: 0.8 % — SIGNIFICANT CHANGE UP (ref 0–2)
BILIRUB SERPL-MCNC: 0.3 MG/DL — LOW (ref 0.4–2)
BLD GP AB SCN SERPL QL: SIGNIFICANT CHANGE UP
BUN SERPL-MCNC: 9.9 MG/DL — SIGNIFICANT CHANGE UP (ref 8–20)
CALCIUM SERPL-MCNC: 8.6 MG/DL — SIGNIFICANT CHANGE UP (ref 8.4–10.5)
CHLORIDE SERPL-SCNC: 103 MMOL/L — SIGNIFICANT CHANGE UP (ref 96–108)
CO2 SERPL-SCNC: 26 MMOL/L — SIGNIFICANT CHANGE UP (ref 22–29)
CREAT SERPL-MCNC: 0.53 MG/DL — SIGNIFICANT CHANGE UP (ref 0.5–1.3)
EGFR: 123 ML/MIN/1.73M2 — SIGNIFICANT CHANGE UP
EOSINOPHIL # BLD AUTO: 0.24 K/UL — SIGNIFICANT CHANGE UP (ref 0–0.5)
EOSINOPHIL NFR BLD AUTO: 3.9 % — SIGNIFICANT CHANGE UP (ref 0–6)
GLUCOSE SERPL-MCNC: 91 MG/DL — SIGNIFICANT CHANGE UP (ref 70–99)
HCT VFR BLD CALC: 28.6 % — LOW (ref 34.5–45)
HGB BLD-MCNC: 9.3 G/DL — LOW (ref 11.5–15.5)
IMM GRANULOCYTES NFR BLD AUTO: 0.5 % — SIGNIFICANT CHANGE UP (ref 0–0.9)
INR BLD: 0.93 RATIO — SIGNIFICANT CHANGE UP (ref 0.85–1.18)
LYMPHOCYTES # BLD AUTO: 1.33 K/UL — SIGNIFICANT CHANGE UP (ref 1–3.3)
LYMPHOCYTES # BLD AUTO: 21.7 % — SIGNIFICANT CHANGE UP (ref 13–44)
MCHC RBC-ENTMCNC: 30.3 PG — SIGNIFICANT CHANGE UP (ref 27–34)
MCHC RBC-ENTMCNC: 32.5 GM/DL — SIGNIFICANT CHANGE UP (ref 32–36)
MCV RBC AUTO: 93.2 FL — SIGNIFICANT CHANGE UP (ref 80–100)
MONOCYTES # BLD AUTO: 0.53 K/UL — SIGNIFICANT CHANGE UP (ref 0–0.9)
MONOCYTES NFR BLD AUTO: 8.7 % — SIGNIFICANT CHANGE UP (ref 2–14)
NEUTROPHILS # BLD AUTO: 3.94 K/UL — SIGNIFICANT CHANGE UP (ref 1.8–7.4)
NEUTROPHILS NFR BLD AUTO: 64.4 % — SIGNIFICANT CHANGE UP (ref 43–77)
PLATELET # BLD AUTO: 427 K/UL — HIGH (ref 150–400)
POTASSIUM SERPL-MCNC: 4.4 MMOL/L — SIGNIFICANT CHANGE UP (ref 3.5–5.3)
POTASSIUM SERPL-SCNC: 4.4 MMOL/L — SIGNIFICANT CHANGE UP (ref 3.5–5.3)
PROT SERPL-MCNC: 6.2 G/DL — LOW (ref 6.6–8.7)
PROTHROM AB SERPL-ACNC: 10.3 SEC — SIGNIFICANT CHANGE UP (ref 9.5–13)
RBC # BLD: 3.07 M/UL — LOW (ref 3.8–5.2)
RBC # FLD: 13.7 % — SIGNIFICANT CHANGE UP (ref 10.3–14.5)
SODIUM SERPL-SCNC: 140 MMOL/L — SIGNIFICANT CHANGE UP (ref 135–145)
WBC # BLD: 6.12 K/UL — SIGNIFICANT CHANGE UP (ref 3.8–10.5)
WBC # FLD AUTO: 6.12 K/UL — SIGNIFICANT CHANGE UP (ref 3.8–10.5)

## 2024-04-18 PROCEDURE — 99284 EMERGENCY DEPT VISIT MOD MDM: CPT | Mod: 25

## 2024-04-18 PROCEDURE — 93970 EXTREMITY STUDY: CPT

## 2024-04-18 PROCEDURE — 85610 PROTHROMBIN TIME: CPT

## 2024-04-18 PROCEDURE — 86900 BLOOD TYPING SEROLOGIC ABO: CPT

## 2024-04-18 PROCEDURE — 87186 SC STD MICRODIL/AGAR DIL: CPT

## 2024-04-18 PROCEDURE — 87070 CULTURE OTHR SPECIMN AEROBIC: CPT

## 2024-04-18 PROCEDURE — 87077 CULTURE AEROBIC IDENTIFY: CPT

## 2024-04-18 PROCEDURE — 85730 THROMBOPLASTIN TIME PARTIAL: CPT

## 2024-04-18 PROCEDURE — T1013: CPT

## 2024-04-18 PROCEDURE — 96374 THER/PROPH/DIAG INJ IV PUSH: CPT

## 2024-04-18 PROCEDURE — 86901 BLOOD TYPING SEROLOGIC RH(D): CPT

## 2024-04-18 PROCEDURE — 93970 EXTREMITY STUDY: CPT | Mod: 26

## 2024-04-18 PROCEDURE — 86850 RBC ANTIBODY SCREEN: CPT

## 2024-04-18 PROCEDURE — 85025 COMPLETE CBC W/AUTO DIFF WBC: CPT

## 2024-04-18 PROCEDURE — 80053 COMPREHEN METABOLIC PANEL: CPT

## 2024-04-18 PROCEDURE — 36415 COLL VENOUS BLD VENIPUNCTURE: CPT

## 2024-04-18 RX ORDER — SENNA PLUS 8.6 MG/1
2 TABLET ORAL
Qty: 60 | Refills: 0
Start: 2024-04-18 | End: 2024-05-17

## 2024-04-18 RX ORDER — POLYETHYLENE GLYCOL 3350 17 G/17G
17 POWDER, FOR SOLUTION ORAL
Qty: 1 | Refills: 0
Start: 2024-04-18 | End: 2024-05-17

## 2024-04-18 RX ORDER — ACETAMINOPHEN 500 MG
650 TABLET ORAL ONCE
Refills: 0 | Status: COMPLETED | OUTPATIENT
Start: 2024-04-18 | End: 2024-04-18

## 2024-04-18 RX ADMIN — Medication 650 MILLIGRAM(S): at 03:15

## 2024-04-18 RX ADMIN — Medication 15 MILLIGRAM(S): at 01:11

## 2024-04-18 NOTE — ED ADULT NURSE NOTE - NSFALLUNIVINTERV_ED_ALL_ED
Bed/Stretcher in lowest position, wheels locked, appropriate side rails in place/Call bell, personal items and telephone in reach/Instruct patient to call for assistance before getting out of bed/chair/stretcher/Non-slip footwear applied when patient is off stretcher/[place] to call system/Physically safe environment - no spills, clutter or unnecessary equipment/Purposeful proactive rounding/Room/bathroom lighting operational, light cord in reach

## 2024-04-18 NOTE — CONSULT NOTE ADULT - SUBJECTIVE AND OBJECTIVE BOX
INCOMPLETE  service with [employer]     HPI:     38 year old  POD# 10 s/p scheduled rC/S on 24 at 39wk who presents to the ED for incisional drainage. Patient stated noticing yellow drainage for 1 day. She also noted generalized abdominal pain for few days. She attributes this to constipation.  She denies pain along incision. She denies fevers, chills, CP, SOB, N/V, dysuria or vaginal bleeding. She has been taking Tylenol intermittently for pain control.         Obhx:    G1 - PCS 3/22/10 40wk in [facility], due to narrow pelvis   G2 - scheduled rC/S on 24  Gynhx: denies fibroids, cysts, STIs, abnl paps   Pmhx: denies   Pshx: CSx2, adolfo   Meds: PNV   Allergies: NKDA   Social Hx: denies tobacco, recreational drug, alcohol use; Feels safe at home        Vital Signs Last 24 Hrs  T(C): 36.5 (2024 22:08), Max: 36.5 (2024 22:08)  T(F): 97.7 (2024 22:08), Max: 97.7 (2024 22:08)  HR: 74 (2024 22:08) (74 - 74)  BP: 101/68 (2024 22:08) (101/68 - 101/68)  BP(mean): --  RR: 17 (2024 22:08) (17 - 17)  SpO2: 100% (2024 22:08) (100% - 100%)    Parameters below as of 2024 22:08  Patient On (Oxygen Delivery Method): room air         PHYSICAL EXAM:  CHEST/LUNG: Clear to percussion bilaterally; No rales, rhonchi, wheezing, or rubs  HEART: Regular rate and rhythm; No murmurs, rubs, or gallops  ABDOMEN: Soft, Nontender, Nondistended; no rebound tenderness or rigidity   Incision: Steri strips still in place, removed; noted to be damp; Incision with mild odor; incision inspected, intact no drainage noted after probing. In middle of incision small 0.5 cm skin opening, subQ layer intact. Nonindurated, no warmth or redness.   EXTREMITIES:  2+ Peripheral Pulses, No clubbing, cyanosis, or edema  PELVIC: deferred        LABS:                        9.3    6.12  )-----------( 427      ( 2024 23:45 )             28.6         140  |  103  |  9.9  ----------------------------<  91  4.4   |  26.0  |  0.53    Ca    8.6      2024 23:45    TPro  6.2<L>  /  Alb  3.5  /  TBili  0.3<L>  /  DBili  x   /  AST  40<H>  /  ALT  55<H>  /  AlkPhos  138<H>      Urinalysis Basic - ( 2024 23:45 )    Color: x / Appearance: x / SG: x / pH: x  Gluc: 91 mg/dL / Ketone: x  / Bili: x / Urobili: x   Blood: x / Protein: x / Nitrite: x   Leuk Esterase: x / RBC: x / WBC x   Sq Epi: x / Non Sq Epi: x / Bacteria: x        service with [employer]     HPI:     38 year old  POD# 10 s/p scheduled rC/S on 24 at 39wk who presents to the ED for incisional drainage. Patient stated noticing yellow drainage for 1 day. She also noted generalized abdominal pain for few days. She attributes this to constipation.  She denies pain along incision. She denies fevers, chills, CP, SOB, N/V, dysuria or vaginal bleeding. She has been taking Tylenol intermittently for pain control.         Obhx:    G1 - PCS 3/22/10 40wk in [facility], due to narrow pelvis   G2 - scheduled rC/S on 24  Gynhx: denies fibroids, cysts, STIs, abnl paps   Pmhx: denies   Pshx: CSx2, adolfo   Meds: PNV   Allergies: NKDA   Social Hx: denies tobacco, recreational drug, alcohol use; Feels safe at home        Vital Signs Last 24 Hrs  T(C): 36.5 (2024 22:08), Max: 36.5 (2024 22:08)  T(F): 97.7 (2024 22:08), Max: 97.7 (2024 22:08)  HR: 74 (2024 22:08) (74 - 74)  BP: 101/68 (2024 22:08) (101/68 - 101/68)  BP(mean): --  RR: 17 (2024 22:08) (17 - 17)  SpO2: 100% (2024 22:08) (100% - 100%)    Parameters below as of 2024 22:08  Patient On (Oxygen Delivery Method): room air         PHYSICAL EXAM:  CHEST/LUNG: Clear to percussion bilaterally; No rales, rhonchi, wheezing, or rubs  HEART: Regular rate and rhythm; No murmurs, rubs, or gallops  ABDOMEN: Soft, Nontender, Nondistended; no rebound tenderness or rigidity   Incision: Steri strips still in place, removed; noted to be damp; Incision with mild odor; incision inspected, intact no drainage noted after probing. In middle of incision small 0.5 cm skin opening, subQ layer intact, only superficial skin layer exposed. Nonindurated, no warmth or redness.   EXTREMITIES:  2+ Peripheral Pulses, No clubbing, cyanosis, or edema  PELVIC: deferred        LABS:                        9.3    6.12  )-----------( 427      ( 2024 23:45 )             28.6         140  |  103  |  9.9  ----------------------------<  91  4.4   |  26.0  |  0.53    Ca    8.6      2024 23:45    TPro  6.2<L>  /  Alb  3.5  /  TBili  0.3<L>  /  DBili  x   /  AST  40<H>  /  ALT  55<H>  /  AlkPhos  138<H>      Urinalysis Basic - ( 2024 23:45 )    Color: x / Appearance: x / SG: x / pH: x  Gluc: 91 mg/dL / Ketone: x  / Bili: x / Urobili: x   Blood: x / Protein: x / Nitrite: x   Leuk Esterase: x / RBC: x / WBC x   Sq Epi: x / Non Sq Epi: x / Bacteria: x        service with [employer]     HPI:     38 year old  POD# 10 s/p scheduled rC/S on 24 at 39wk who presents to the ED for incisional drainage. Patient stated noticing yellow drainage for 1 day. She also noted generalized abdominal pain for few days. She attributes this to constipation.  She denies pain along incision. She denies fevers, chills, CP, SOB, N/V, dysuria or vaginal bleeding. She has been taking Tylenol intermittently for pain control.         Obhx:    G1 - PCS 3/22/10 40wk in [facility], due to narrow pelvis   G2 - scheduled rC/S on 24  Gynhx: denies fibroids, cysts, STIs, abnl paps   Pmhx: denies   Pshx: CSx2, adolfo   Meds: PNV   Allergies: NKDA   Social Hx: denies tobacco, recreational drug, alcohol use; Feels safe at home        Vital Signs Last 24 Hrs  T(C): 36.5 (2024 22:08), Max: 36.5 (2024 22:08)  T(F): 97.7 (2024 22:08), Max: 97.7 (2024 22:08)  HR: 74 (2024 22:08) (74 - 74)  BP: 101/68 (2024 22:08) (101/68 - 101/68)  BP(mean): --  RR: 17 (2024 22:08) (17 - 17)  SpO2: 100% (2024 22:08) (100% - 100%)    Parameters below as of 2024 22:08  Patient On (Oxygen Delivery Method): room air         PHYSICAL EXAM:  CHEST/LUNG: Clear to percussion bilaterally; No rales, rhonchi, wheezing, or rubs  HEART: Regular rate and rhythm; No murmurs, rubs, or gallops  ABDOMEN: Soft, Nontender, Nondistended; no rebound tenderness or rigidity   Incision: Steri strips still in place, removed; noted to be damp; Incision with mild odor; incision inspected, intact no drainage noted after probing. In middle of incision small 0.5 cm skin opening of only superficial dermal layer; subQ layer intact. Nonindurated, no warmth or redness.   EXTREMITIES:  2+ Peripheral Pulses, No clubbing, cyanosis, or edema  PELVIC: deferred        LABS:                        9.3    6.12  )-----------( 427      ( 2024 23:45 )             28.6         140  |  103  |  9.9  ----------------------------<  91  4.4   |  26.0  |  0.53    Ca    8.6      2024 23:45    TPro  6.2<L>  /  Alb  3.5  /  TBili  0.3<L>  /  DBili  x   /  AST  40<H>  /  ALT  55<H>  /  AlkPhos  138<H>      Urinalysis Basic - ( 2024 23:45 )    Color: x / Appearance: x / SG: x / pH: x  Gluc: 91 mg/dL / Ketone: x  / Bili: x / Urobili: x   Blood: x / Protein: x / Nitrite: x   Leuk Esterase: x / RBC: x / WBC x   Sq Epi: x / Non Sq Epi: x / Bacteria: x

## 2024-04-18 NOTE — CONSULT NOTE ADULT - ATTENDING COMMENTS
POD10  s/p scheduled RCS here with incision disruption with yellow malodorous discharge  Afebrile, no elevated white count  incision with only 0.5cm opening, no erythema or induration. Culture obtained  will start on Augment BID for 7 days   f/u in the office  strong precautions provided  discharge home  ppalos Elevated AST (SGOT)

## 2024-04-18 NOTE — CONSULT NOTE ADULT - ASSESSMENT
38 year old  POD# 10 s/p scheduled rC/S on 24 at 39wk who presents to the ED for incisional drainage.     - VSS, afebrile, HR normal  - H.3; WBC: 6.12  - Incision inspected thoroughly after removal of steri strips. Surgical swab collected along 0.5 cm skin opening in middle of incision; subQ layer intact. No active drainage noted. Due to odor, will treat with abx.   - Rx sent for Augmentin BID x7 days   - Discussed hygiene precautions including loose underwear, cleaning incision with water and keeping area dry.  - Patient verbalized understanding.   - Patient has upcoming appt with [facility] this .  - ED precautions given     D/w Dr. Batista - [facility] attending  38 year old  POD# 10 s/p scheduled rC/S on 24 at 39wk who presents to the ED for incisional drainage.     - VSS, afebrile, HR normal  - H.3; WBC: 6.12  - Incision inspected thoroughly after removal of steri strips. Surgical swab collected along 0.5 cm skin opening in middle of incision; subQ layer intact with only superficial dermal layer seen. No active drainage noted. Due to odor, will treat with abx.   - Rx sent for Augmentin BID x7 days   - Discussed hygiene precautions including loose underwear, cleaning incision with water and keeping area dry.  - Patient verbalized understanding.   - Patient has upcoming appt with [facility] this .  - ED precautions given     D/w Dr. Batista - [facility] attending  38 year old  POD# 10 s/p scheduled rC/S on 24 at 39wk who presents to the ED for incisional drainage.     - VSS, afebrile, HR normal  - H.3; WBC: 6.12  - Incision inspected thoroughly after removal of steri strips. Surgical swab collected along 0.5 cm skin opening in middle of incision of only superficial dermal layer; subQ layer intact. No active drainage noted. Due to odor, will treat with abx.   - Rx sent for Augmentin BID x7 days   - Discussed hygiene precautions including loose underwear, cleaning incision with water and keeping area dry.  - Patient verbalized understanding.   - Patient has upcoming appt with [facility] this .  - ED precautions given     D/w Dr. Batista - [facility] attending

## 2024-04-18 NOTE — ED ADULT NURSE NOTE - OBJECTIVE STATEMENT
Pt is 38 year old female c/o post partum complication. pt 9 days post   and noticed stitches from C section looked open; pt has lower abdominal pain

## 2024-04-20 LAB
-  AMOXICILLIN/CLAVULANIC ACID: SIGNIFICANT CHANGE UP
-  AMOXICILLIN/CLAVULANIC ACID: SIGNIFICANT CHANGE UP
-  AMPICILLIN/SULBACTAM: SIGNIFICANT CHANGE UP
-  AMPICILLIN/SULBACTAM: SIGNIFICANT CHANGE UP
-  AMPICILLIN: SIGNIFICANT CHANGE UP
-  AZTREONAM: SIGNIFICANT CHANGE UP
-  AZTREONAM: SIGNIFICANT CHANGE UP
-  CEFAZOLIN: SIGNIFICANT CHANGE UP
-  CEFAZOLIN: SIGNIFICANT CHANGE UP
-  CEFEPIME: SIGNIFICANT CHANGE UP
-  CEFEPIME: SIGNIFICANT CHANGE UP
-  CEFOXITIN: SIGNIFICANT CHANGE UP
-  CEFOXITIN: SIGNIFICANT CHANGE UP
-  CEFTRIAXONE: SIGNIFICANT CHANGE UP
-  CEFTRIAXONE: SIGNIFICANT CHANGE UP
-  CIPROFLOXACIN: SIGNIFICANT CHANGE UP
-  CIPROFLOXACIN: SIGNIFICANT CHANGE UP
-  ERTAPENEM: SIGNIFICANT CHANGE UP
-  ERTAPENEM: SIGNIFICANT CHANGE UP
-  GENTAMICIN: SIGNIFICANT CHANGE UP
-  GENTAMICIN: SIGNIFICANT CHANGE UP
-  IMIPENEM: SIGNIFICANT CHANGE UP
-  LEVOFLOXACIN: SIGNIFICANT CHANGE UP
-  LEVOFLOXACIN: SIGNIFICANT CHANGE UP
-  MEROPENEM: SIGNIFICANT CHANGE UP
-  MEROPENEM: SIGNIFICANT CHANGE UP
-  PIPERACILLIN/TAZOBACTAM: SIGNIFICANT CHANGE UP
-  PIPERACILLIN/TAZOBACTAM: SIGNIFICANT CHANGE UP
-  TOBRAMYCIN: SIGNIFICANT CHANGE UP
-  TOBRAMYCIN: SIGNIFICANT CHANGE UP
-  TRIMETHOPRIM/SULFAMETHOXAZOLE: SIGNIFICANT CHANGE UP
-  TRIMETHOPRIM/SULFAMETHOXAZOLE: SIGNIFICANT CHANGE UP
-  VANCOMYCIN: SIGNIFICANT CHANGE UP
METHOD TYPE: SIGNIFICANT CHANGE UP

## 2024-04-22 RX ORDER — CEFPODOXIME PROXETIL 100 MG
1 TABLET ORAL
Qty: 14 | Refills: 0
Start: 2024-04-22 | End: 2024-04-28

## 2024-04-23 LAB
CULTURE RESULTS: ABNORMAL
ORGANISM # SPEC MICROSCOPIC CNT: ABNORMAL
ORGANISM # SPEC MICROSCOPIC CNT: SIGNIFICANT CHANGE UP
SPECIMEN SOURCE: SIGNIFICANT CHANGE UP

## 2024-05-12 ENCOUNTER — EMERGENCY (EMERGENCY)
Facility: HOSPITAL | Age: 39
LOS: 1 days | Discharge: HOME | End: 2024-05-12
Attending: EMERGENCY MEDICINE
Payer: COMMERCIAL

## 2024-05-12 VITALS
HEIGHT: 60 IN | DIASTOLIC BLOOD PRESSURE: 56 MMHG | RESPIRATION RATE: 16 BRPM | HEART RATE: 72 BPM | TEMPERATURE: 97 F | SYSTOLIC BLOOD PRESSURE: 95 MMHG | WEIGHT: 141.1 LBS | OXYGEN SATURATION: 99 %

## 2024-05-12 PROCEDURE — 99283 EMERGENCY DEPT VISIT LOW MDM: CPT

## 2024-05-12 PROCEDURE — T1013: CPT

## 2024-05-12 PROCEDURE — G0463: CPT

## 2024-05-12 NOTE — ED PROVIDER NOTE - ATTENDING APP SHARED VISIT CONTRIBUTION OF CARE
Kenny: I performed a face to face bedside interview with patient regarding history of present illness, review of symptoms and past medical history. I completed an independent physical exam.  I have discussed patient's plan of care with advanced care provider.   I agree with note as stated above including HISTORY OF PRESENT ILLNESS, HIV, PAST MEDICAL/SURGICAL/FAMILY/SOCIAL HISTORY, ALLERGIES AND HOME MEDICATIONS, REVIEW OF SYSTEMS, PHYSICAL EXAM, MEDICAL DECISION MAKING and any PROGRESS NOTES during the time I functioned as the attending physician for this patient  unless otherwise noted. My brief assessment is as follows: csection ~1 month ago p/w for eval of "string" to the area. states mild discomfort to site. no discharge or erythema. no f/c. no significant abd pain. with small string at area ?loose suture. removed and pt states she feels better. benign abd. no sign infection. supportive care. return precautions.

## 2024-05-12 NOTE — ED PROVIDER NOTE - PATIENT PORTAL LINK FT
You can access the FollowMyHealth Patient Portal offered by [facility] by registering at the following website: [email or web address]. By joining [employer]’s FollowMyHealth portal, you will also be able to view your health information using other applications (apps) compatible with our system.

## 2024-05-12 NOTE — ED ADULT TRIAGE NOTE - CHIEF COMPLAINT QUOTE
Patient presents to ED s/p  on  for evaluation of the scar. States she sees a string on the incision and wants to see if it can be removed.

## 2024-05-12 NOTE — ED PROVIDER NOTE - PHYSICAL EXAMINATION
Abdomen: Soft, nontender,  scar is healing well with no signs of erythema, discharge, or dehiscence.  There is no tenderness around the scar site.  There was a small piece of loose absorbable suture on attached to the skin that was lightly brushed off.  Patient reports she feels better after this.

## 2024-05-12 NOTE — ED PROVIDER NOTE - NSFOLLOWUPINSTRUCTIONS_ED_ALL_ED_FT
Patient is a 43 year old male who is requesting prophylactic treatment for influenza.  His daughter was diagnosed in urgent care today with influenza A.  The prescription was sent to [facility] pharmacy.  Patient to follow up with Dr. Singh next week as needed.   
Please follow-up with your OB/GYN within 3 days        SEEK IMMEDIATE MEDICAL CARE IF YOU HAVE ANY OF THE FOLLOWING SYMPTOMS: swelling around the wound, worsening pain, drainage from the wound, red streaking going away from your wound, inability to move finger or toe near the laceration, or discoloration of skin near the laceration.

## 2024-05-12 NOTE — ED PROVIDER NOTE - OBJECTIVE STATEMENT
38-year-old female status post  at the end of April presents emergency department complaining of a irritated area to the right lower  site.  She states she notices a string there that she has been irritating her.  Denies any fever, chills, nausea, or vomiting

## 2024-05-12 NOTE — ED PROVIDER NOTE - CLINICAL SUMMARY MEDICAL DECISION MAKING FREE TEXT BOX
38-year-old female status post  presents emergency department for a wound check.  Wound is healing well with no signs of infection or dehiscence.  Will discharge outpatient follow-up.  ED return precautions discussed

## 2024-07-01 NOTE — ED ADULT NURSE NOTE - DRUG PRE-SCREENING (DAST -1)
2024    This is a 28 y.o. female   Chief Complaint   Patient presents with    Sinus Problem     X10 days.  Was here last Wednesday and was given amoxicillin.  Symptoms have gotten worse.  Ears are worse.    .    HPI  Patient reports that symptoms started about 10 days ago. Seen Dr. Lofton on  and dx with acute sinusitis and started on amoxil 875 mg BID for 7 days.   Reports that she continues with sinus congestion, ear congestion, PND, rhinorrhea that is yellow, slight sore throat.  Denies fever, shortness of breath  Has taken mucinex, tylenol without improvement in symptoms. Has used flonase for the past 3 days.   Has also used sinus rinse BID with this infection.     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 2 each 5    ondansetron (ZOFRAN-ODT) 4 MG disintegrating tablet DISSOLVE 1 TABLET BY MOUTH EVERY 8 HOURS AS NEEDED FOR NAUSEA AND/OR VOMITING 15 tablet 1    clotrimazole (LOTRIMIN) 2 % CREA vaginal cream Place vaginally daily for 5 days 1 each 0    sertraline (ZOLOFT) 25 MG tablet Take one tab by mouth daily 90 tablet 1    hydroxychloroquine (PLAQUENIL) 200 MG tablet Take by mouth daily       No current facility-administered medications for this visit.       Allergies   Allergen 
Statement Selected

## 2024-08-14 NOTE — ED ADULT NURSE NOTE - DISCHARGE DATE/TIME
Assessment:  Encounter Diagnosis   Name Primary?    Facial cellulitis Yes       Plan:  1. Facial cellulitis  -     predniSONE (DELTASONE) 20 MG tablet; Take 1 tablet by mouth 2 times daily for 5 days, Disp-10 tablet, R-0Normal  -     amoxicillin-clavulanate (AUGMENTIN) 875-125 MG per tablet; Take 1 tablet by mouth 2 times daily for 7 days, Disp-14 tablet, R-0Normal  TM and auditory canal normal.  Will treat as possible facial cellulitis versus allergic reaction.  Start steroids and Augmentin as above.  If not improving then recommend ENT evaluation      No follow-ups on file.      Patient: Arlet Fuller is a 62 y.o. female who presents today with the following Chief Complaint(s):  Chief Complaint   Patient presents with    Otalgia     Right ear x 4 days       Headache    Facial Pain     Congestion in am , runny nose   Right side of head is swollen            HPI    Reports symptoms started 3 days ago. Woke up with right ear pain. Next day noticed swelling in the area. +chills, no true fever. No discharge or drainage.     Current Outpatient Medications   Medication Sig Dispense Refill    predniSONE (DELTASONE) 20 MG tablet Take 1 tablet by mouth 2 times daily for 5 days 10 tablet 0    amoxicillin-clavulanate (AUGMENTIN) 875-125 MG per tablet Take 1 tablet by mouth 2 times daily for 7 days 14 tablet 0    gabapentin (NEURONTIN) 100 MG capsule Take 1 capsule by mouth 3 times daily for 90 days. for 30 days 270 capsule 0    levothyroxine (SYNTHROID) 100 MCG tablet TAKE 1 TABLET BY MOUTH DAILY 90 tablet 1    losartan (COZAAR) 50 MG tablet TAKE 1 TABLET BY MOUTH DAILY 90 tablet 3    CALCIUM PO Take by mouth daily      Omega-3 Fatty Acids (FISH OIL PO) Take by mouth daily      Multiple Vitamins-Minerals (THERAPEUTIC MULTIVITAMIN-MINERALS) tablet Take 1 tablet by mouth daily      UNABLE TO FIND Ambren OTC- once  a day      diclofenac sodium (VOLTAREN) 1 % GEL Apply 2 to 4 g 3 times daily for 2 weeks then twice daily as 
18-Apr-2024 03:43

## 2024-09-25 NOTE — OB RN PREOPERATIVE CHECKLIST - ALLERGIES REVIEWED
done
MEDICATIONS  (STANDING):  chlorhexidine 2% Cloths 1 Application(s) Topical <User Schedule>  dextrose 5%. 1000 milliLiter(s) (50 mL/Hr) IV Continuous <Continuous>  dextrose 5%. 1000 milliLiter(s) (100 mL/Hr) IV Continuous <Continuous>  dextrose 50% Injectable 12.5 Gram(s) IV Push once  dextrose 50% Injectable 25 Gram(s) IV Push once  dextrose 50% Injectable 25 Gram(s) IV Push once  epoetin dustin-epbx (RETACRIT) Injectable 57385 Unit(s) IV Push <User Schedule>  folic acid 1 milliGRAM(s) Oral daily  glucagon  Injectable 1 milliGRAM(s) IntraMuscular once  heparin   Injectable 5000 Unit(s) SubCutaneous every 8 hours  hydrALAZINE 100 milliGRAM(s) Oral every 8 hours  influenza   Vaccine 0.5 milliLiter(s) IntraMuscular once  insulin lispro (ADMELOG) corrective regimen sliding scale   SubCutaneous every 6 hours  labetalol 600 milliGRAM(s) Oral three times a day  pantoprazole    Tablet 40 milliGRAM(s) Oral daily  rosuvastatin 20 milliGRAM(s) Oral at bedtime    MEDICATIONS  (PRN):  dextrose Oral Gel 15 Gram(s) Oral once PRN Blood Glucose LESS THAN 70 milliGRAM(s)/deciliter